# Patient Record
Sex: FEMALE | Race: WHITE | NOT HISPANIC OR LATINO | Employment: FULL TIME | URBAN - METROPOLITAN AREA
[De-identification: names, ages, dates, MRNs, and addresses within clinical notes are randomized per-mention and may not be internally consistent; named-entity substitution may affect disease eponyms.]

---

## 2024-07-23 ENCOUNTER — APPOINTMENT (OUTPATIENT)
Dept: CT IMAGING | Facility: HOSPITAL | Age: 54
End: 2024-07-23
Payer: COMMERCIAL

## 2024-07-23 ENCOUNTER — HOSPITAL ENCOUNTER (EMERGENCY)
Facility: HOSPITAL | Age: 54
Discharge: HOME | End: 2024-07-23
Payer: COMMERCIAL

## 2024-07-23 ENCOUNTER — APPOINTMENT (OUTPATIENT)
Dept: GENERAL RADIOLOGY | Facility: HOSPITAL | Age: 54
End: 2024-07-23
Payer: COMMERCIAL

## 2024-07-23 VITALS
BODY MASS INDEX: 44.75 KG/M2 | WEIGHT: 237 LBS | SYSTOLIC BLOOD PRESSURE: 160 MMHG | DIASTOLIC BLOOD PRESSURE: 81 MMHG | HEIGHT: 61 IN | OXYGEN SATURATION: 97 % | RESPIRATION RATE: 18 BRPM | TEMPERATURE: 98.4 F | HEART RATE: 71 BPM

## 2024-07-23 DIAGNOSIS — I10 HYPERTENSION, UNSPECIFIED TYPE: ICD-10-CM

## 2024-07-23 DIAGNOSIS — R51.9 NONINTRACTABLE HEADACHE, UNSPECIFIED CHRONICITY PATTERN, UNSPECIFIED HEADACHE TYPE: Primary | ICD-10-CM

## 2024-07-23 LAB
ALBUMIN SERPL-MCNC: 4.5 G/DL (ref 3.5–5.2)
ALBUMIN/GLOB SERPL: 1.5 G/DL
ALP SERPL-CCNC: 66 U/L (ref 39–117)
ALT SERPL W P-5'-P-CCNC: 11 U/L (ref 1–33)
ANION GAP SERPL CALCULATED.3IONS-SCNC: 14.1 MMOL/L (ref 5–15)
AST SERPL-CCNC: 17 U/L (ref 1–32)
BACTERIA UR QL AUTO: ABNORMAL /HPF
BASOPHILS # BLD AUTO: 0.02 10*3/MM3 (ref 0–0.2)
BASOPHILS NFR BLD AUTO: 0.4 % (ref 0–1.5)
BILIRUB SERPL-MCNC: 0.8 MG/DL (ref 0–1.2)
BILIRUB UR QL STRIP: NEGATIVE
BUN SERPL-MCNC: 9 MG/DL (ref 6–20)
BUN/CREAT SERPL: 10.6 (ref 7–25)
CALCIUM SPEC-SCNC: 9.7 MG/DL (ref 8.6–10.5)
CHLORIDE SERPL-SCNC: 102 MMOL/L (ref 98–107)
CLARITY UR: ABNORMAL
CO2 SERPL-SCNC: 20.9 MMOL/L (ref 22–29)
COLOR UR: YELLOW
CREAT SERPL-MCNC: 0.85 MG/DL (ref 0.57–1)
DEPRECATED RDW RBC AUTO: 43.4 FL (ref 37–54)
EGFRCR SERPLBLD CKD-EPI 2021: 81 ML/MIN/1.73
EOSINOPHIL # BLD AUTO: 0.02 10*3/MM3 (ref 0–0.4)
EOSINOPHIL NFR BLD AUTO: 0.4 % (ref 0.3–6.2)
ERYTHROCYTE [DISTWIDTH] IN BLOOD BY AUTOMATED COUNT: 13.3 % (ref 12.3–15.4)
GLOBULIN UR ELPH-MCNC: 3 GM/DL
GLUCOSE SERPL-MCNC: 86 MG/DL (ref 65–99)
GLUCOSE UR STRIP-MCNC: NEGATIVE MG/DL
HCT VFR BLD AUTO: 45.1 % (ref 34–46.6)
HGB BLD-MCNC: 14.6 G/DL (ref 12–15.9)
HGB UR QL STRIP.AUTO: NEGATIVE
HOLD SPECIMEN: NORMAL
HOLD SPECIMEN: NORMAL
HYALINE CASTS UR QL AUTO: ABNORMAL /LPF
IMM GRANULOCYTES # BLD AUTO: 0.02 10*3/MM3 (ref 0–0.05)
IMM GRANULOCYTES NFR BLD AUTO: 0.4 % (ref 0–0.5)
KETONES UR QL STRIP: ABNORMAL
LEUKOCYTE ESTERASE UR QL STRIP.AUTO: ABNORMAL
LYMPHOCYTES # BLD AUTO: 1.27 10*3/MM3 (ref 0.7–3.1)
LYMPHOCYTES NFR BLD AUTO: 22.8 % (ref 19.6–45.3)
MCH RBC QN AUTO: 28.9 PG (ref 26.6–33)
MCHC RBC AUTO-ENTMCNC: 32.4 G/DL (ref 31.5–35.7)
MCV RBC AUTO: 89.3 FL (ref 79–97)
MONOCYTES # BLD AUTO: 0.33 10*3/MM3 (ref 0.1–0.9)
MONOCYTES NFR BLD AUTO: 5.9 % (ref 5–12)
NEUTROPHILS NFR BLD AUTO: 3.91 10*3/MM3 (ref 1.7–7)
NEUTROPHILS NFR BLD AUTO: 70.1 % (ref 42.7–76)
NITRITE UR QL STRIP: NEGATIVE
NRBC BLD AUTO-RTO: 0 /100 WBC (ref 0–0.2)
PH UR STRIP.AUTO: <=5 [PH] (ref 5–8)
PLATELET # BLD AUTO: 223 10*3/MM3 (ref 140–450)
PMV BLD AUTO: 10.2 FL (ref 6–12)
POTASSIUM SERPL-SCNC: 3.8 MMOL/L (ref 3.5–5.2)
PROT SERPL-MCNC: 7.5 G/DL (ref 6–8.5)
PROT UR QL STRIP: NEGATIVE
RBC # BLD AUTO: 5.05 10*6/MM3 (ref 3.77–5.28)
RBC # UR STRIP: ABNORMAL /HPF
REF LAB TEST METHOD: ABNORMAL
SODIUM SERPL-SCNC: 137 MMOL/L (ref 136–145)
SP GR UR STRIP: 1.02 (ref 1–1.03)
SQUAMOUS #/AREA URNS HPF: ABNORMAL /HPF
UROBILINOGEN UR QL STRIP: ABNORMAL
WBC # UR STRIP: ABNORMAL /HPF
WBC NRBC COR # BLD AUTO: 5.57 10*3/MM3 (ref 3.4–10.8)
WHOLE BLOOD HOLD COAG: NORMAL

## 2024-07-23 PROCEDURE — 99284 EMERGENCY DEPT VISIT MOD MDM: CPT

## 2024-07-23 PROCEDURE — 80053 COMPREHEN METABOLIC PANEL: CPT | Performed by: NURSE PRACTITIONER

## 2024-07-23 PROCEDURE — 25010000002 HYDRALAZINE PER 20 MG: Performed by: NURSE PRACTITIONER

## 2024-07-23 PROCEDURE — 71045 X-RAY EXAM CHEST 1 VIEW: CPT

## 2024-07-23 PROCEDURE — 93005 ELECTROCARDIOGRAM TRACING: CPT

## 2024-07-23 PROCEDURE — 96374 THER/PROPH/DIAG INJ IV PUSH: CPT

## 2024-07-23 PROCEDURE — 81001 URINALYSIS AUTO W/SCOPE: CPT | Performed by: NURSE PRACTITIONER

## 2024-07-23 PROCEDURE — 70450 CT HEAD/BRAIN W/O DYE: CPT

## 2024-07-23 PROCEDURE — 85025 COMPLETE CBC W/AUTO DIFF WBC: CPT | Performed by: NURSE PRACTITIONER

## 2024-07-23 RX ORDER — HYDRALAZINE HYDROCHLORIDE 20 MG/ML
10 INJECTION INTRAMUSCULAR; INTRAVENOUS ONCE
Status: COMPLETED | OUTPATIENT
Start: 2024-07-23 | End: 2024-07-23

## 2024-07-23 RX ORDER — SODIUM CHLORIDE 0.9 % (FLUSH) 0.9 %
10 SYRINGE (ML) INJECTION AS NEEDED
Status: DISCONTINUED | OUTPATIENT
Start: 2024-07-23 | End: 2024-07-23 | Stop reason: HOSPADM

## 2024-07-23 RX ORDER — AMLODIPINE BESYLATE 10 MG/1
10 TABLET ORAL DAILY
Qty: 30 TABLET | Refills: 0 | Status: SHIPPED | OUTPATIENT
Start: 2024-07-23

## 2024-07-23 RX ADMIN — HYDRALAZINE HYDROCHLORIDE 10 MG: 20 INJECTION INTRAMUSCULAR; INTRAVENOUS at 14:10

## 2024-07-23 NOTE — DISCHARGE INSTRUCTIONS
Medications as directed follow-up pharmacy precautions and warnings regarding any prescriptions.  Follow-up with family medicine.  Return immediately for any new or worsening symptoms

## 2024-07-23 NOTE — ED PROVIDER NOTES
Subjective     Provider in Triage Note  Due to significant overcrowding in the emergency department patient was initially seen and evaluated in triage.  Provider in triage recommended patient placement in the treatment area to initiate therapy and movement to an ER bed as soon as possible.    Patient presents with lightheadedness and dizziness last few days.  No headache.  No chest pain/soa      History of Present Illness  Patient endorses above history.  She states she has had a very mild non-intractable headache that feels like a band.  She denies any visual changes unilateral focal deficits weakness confusion ataxia or lethargy.  She states she was told 7 or 8 months ago that she had high blood pressure but has not been taking blood pressure medications.  No epistaxis.      Review of Systems   Constitutional:  Negative for fever.       No past medical history on file.    No Known Allergies    Past Surgical History:   Procedure Laterality Date    BREAST SURGERY      DENTAL PROCEDURE         No family history on file.    Social History     Socioeconomic History    Marital status: Single   Tobacco Use    Smoking status: Never   Vaping Use    Vaping status: Never Used   Substance and Sexual Activity    Alcohol use: Yes     Comment: occasional    Drug use: Never    Sexual activity: Defer           Objective   Physical Exam    Vital signs in triage nurse note reviewed.  Constitutional: Awake, alert, well developed and well nourished. No acute distress is noted.  HEENT: Normocephalic, atraumatic; pupils are PERRL with intact EOM; oropharynx is pink and moist without exudate or erythema.  Neck: Supple, full range of motion without pain;   Cardiovascular: Regular rate and rhythm, normal S1-S2.    Pulmonary: Respiratory effort regular, nonlabored; breath sounds clear to auscultation all fields.  Abdomen: Soft, nontender, nondistended with normoactive bowel sounds; no rebound or guarding.  Musculoskeletal: Independent range  of motion of all extremities, no palpable tenderness or edema. Spine is midline without obvious curvature scoliosis. No bony tenderness, soft tissue swelling, deformity is noted. Paraspinal musculature is soft, nontender.  Neuro: Alert oriented x3, speech is clear and appropriate.  Normal shoulder shrug, equal palate rise, no peripheral vision loss, no facial asymmetry,no pronator drift, normal coordination.      Procedures           ED Course  ED Course as of 07/23/24 1601   Tue Jul 23, 2024   1334 Assumed care of patient   [JW]   1448 Labs are hemolyzed. Waiting on ua to be collected [JW]      ED Course User Index  [JW] Leida Turk, NARCISO                                   Labs Reviewed   COMPREHENSIVE METABOLIC PANEL - Abnormal; Notable for the following components:       Result Value    CO2 20.9 (*)     All other components within normal limits    Narrative:     GFR Normal >60  Chronic Kidney Disease <60  Kidney Failure <15     URINALYSIS W/ MICROSCOPIC IF INDICATED (NO CULTURE) - Abnormal; Notable for the following components:    Appearance, UA Hazy (*)     Ketones, UA 40 mg/dL (2+) (*)     Leuk Esterase, UA Large (3+) (*)     All other components within normal limits   URINALYSIS, MICROSCOPIC ONLY - Abnormal; Notable for the following components:    WBC, UA 11-20 (*)     Bacteria, UA 1+ (*)     Squamous Epithelial Cells, UA 7-12 (*)     All other components within normal limits   CBC WITH AUTO DIFFERENTIAL - Normal   CBC AND DIFFERENTIAL    Narrative:     The following orders were created for panel order CBC & Differential.  Procedure                               Abnormality         Status                     ---------                               -----------         ------                     CBC Auto Differential[[id number]]        Normal              Final result                 Please view results for these tests on the individual orders.   EXTRA TUBES    Narrative:     The following orders were  created for panel order Extra Tubes.  Procedure                               Abnormality         Status                     ---------                               -----------         ------                     Gold Top - SST[[id number]]                                   Final result               Light Blue Top[[id number]]                                   Final result                 Please view results for these tests on the individual orders.   GOLD TOP - SST   LIGHT BLUE TOP   EXTRA TUBES    Narrative:     The following orders were created for panel order Extra Tubes.  Procedure                               Abnormality         Status                     ---------                               -----------         ------                     Gold Top - SST[[id number]]                                   Final result                 Please view results for these tests on the individual orders.   GOLD TOP - SST     Medications   sodium chloride 0.9 % flush 10 mL (has no administration in time range)   sodium chloride 0.9 % flush 10 mL (has no administration in time range)   hydrALAZINE (APRESOLINE) injection 10 mg (10 mg Intravenous Given 7/23/24 1410)     CT Head Without Contrast    Result Date: 7/23/2024  1.No acute intracranial abnormality is identified. Electronically Signed: Chuck Ferrari MD  7/23/2024 2:29 PM EDT  Workstation ID: [id number]    XR Chest 1 View    Result Date: 7/23/2024  Impression: 1. No acute cardiopulmonary disease. Electronically Signed: Satinder Wolf MD  7/23/2024 2:29 PM EDT  Workstation ID: [id number]   Prior to Admission medications    Medication Sig Start Date End Date Taking? Authorizing Provider   amLODIPine (NORVASC) 10 MG tablet Take 1 tablet by mouth Daily. 7/23/24   Leida Turk APRN   hydroCHLOROthiazide (HYDRODIURIL) 25 MG tablet Take 1 tablet by mouth Daily for 30 days. 12/20/23 7/23/24  Felix Lugo MD               Medical Decision Making      /84   Pulse 80    "Temp 98.4 °F (36.9 °C)   Resp 18   Ht 154.9 cm (61\")   Wt 108 kg (237 lb)   SpO2 96%   BMI 44.78 kg/m²           Radiology interpretation: CT head and chest x-rays reviewed and interpreted by dov:  Further interpretation by radiologist as above  Lab interpretation:  Labs all viewed by me and significant for, glucose 86, contaminated UA, white count 5.5    EKG viewed and interpreted by Dr. monae  , sr 90 baseline artifact                Appropriate PPE worn during exam.  Patient was seen by provider in triage due to overcrowding and census.  Had labs CT x-ray obtained evaluate for pulmonary edema electrolyte abnormalities dehydration infection ICH.  Was given hydralazine with good improvement of her blood pressure.  She states her headache has subsided and she is neurologically intact.  We discussed signs and symptoms of when to return emergently to the ER. Will be discharged with [facility] and given family medicine referral.       i discussed findings with patient who voices understanding of discharge instructions, signs and symptoms requiring return to ED; discharged improved and in stable condition with follow up for re-evaluation.  This document is intended for medical expert use only. Reading of this document by patients and/or patient's family without participating medical staff guidance may result in misinterpretation and unintended morbidity.  Any interpretation of such data is the responsibility of the patient and/or family member responsible for the patient in concert with their primary or specialist providers, not to be left for sources of online searches such as [employer], [employer] or similar queries. Relying on these approaches to knowledge may result in misinterpretation, misguided goals of care and even death should patients or family members try recommendations outside of the realm of professional medical care in a supervised inpatient environment.     Discussed with dr monae      Problems " Addressed:  Hypertension, unspecified type: complicated acute illness or injury  Nonintractable headache, unspecified chronicity pattern, unspecified headache type: complicated acute illness or injury    Amount and/or Complexity of Data Reviewed  Labs: ordered.  Radiology: ordered.  ECG/medicine tests: ordered.    Risk  Prescription drug management.        Final diagnoses:   Nonintractable headache, unspecified chronicity pattern, unspecified headache type   Hypertension, unspecified type       ED Disposition  ED Disposition       ED Disposition   Discharge    Condition   Stable    Comment   --               family connection  [phone]             Medication List        New Prescriptions      amLODIPine 10 MG tablet  Commonly known as: NORVASC  Take 1 tablet by mouth Daily.            Stop      hydroCHLOROthiazide 25 MG tablet               Where to Get Your Medications        These medications were sent to [facility]/pharmacy #[id number] - [place], [place] - [address] [phone] [place] [phone]   [address] [place] [address]      Phone: [phone]   amLODIPine 10 MG tablet            Leida Turk, APRN  07/23/24 4591

## 2024-07-23 NOTE — NURSING NOTE
BP assessed multiple times with manual cuff.  PT immediately brought back to a room upon assessment of HTN.

## 2024-07-24 LAB
QT INTERVAL: 354 MS
QTC INTERVAL: 433 MS

## 2024-08-12 PROBLEM — F32.A DEPRESSION: Status: ACTIVE | Noted: 2024-08-12

## 2024-08-12 NOTE — PROGRESS NOTES
Chief Complaint  Chief Complaint   Patient presents with    Establish Care    Transitional Care Management     HTN    Hypertension        Subjective          Shiva Gutierrez is here today to establish care. The following problems were discussed:     Family history:Mother- heart disease, HTN. Father- HTN. Maternal grandmother- HTN. Grandfather- possible CVA. Paternal grandmother- colon cancer. Mother- breast cancer.     Social history: Denies smoking, drinking, or illegal drug use.     HTN-Denies CP, SOA. She reports she has had dizziness. Gets occasional headaches. Denies lightheadedness. She recently went to the ED due to feeling dizzy and didn't feel right. She had been told before she had HTN. She had not been on HTN medication. She was placed on HTN medication ED.     Migraines- She gets migraines induced by allergies/sinus headaches.     Depression- She is not currently on medication. She used to take celexa and this helped. Denies SI or HI. She does not see a counselor.     Obesity- She exercises 2 days a week. She reports she does not eat a well balanced diet.     History of abnormal mammogram- Has previous biopsy which was negative. Mother history of breast cancer.      She is from [place]   Previous PCP was Has not seen one in 10 years  Marital status- not   Children- No  Works as [employer]   Exercise- regularly 2 times weekly  Diet- Eating too much junk food and Not eating enough healthy food         Review of Systems   Constitutional:  Negative for chills and fever.   HENT:  Positive for congestion.    Respiratory:  Negative for shortness of breath.    Cardiovascular:  Negative for chest pain.   Gastrointestinal:  Positive for nausea. Negative for abdominal pain and vomiting.   Genitourinary:  Negative for difficulty urinating.   Musculoskeletal:  Negative for myalgias.   Skin: Negative.    Neurological:  Negative for dizziness, light-headedness and headache.        Objective   Vital Signs:  "  Vitals:    08/13/24 0915   BP: 139/81   Pulse:    Temp:    SpO2:       Estimated body mass index is 43.86 kg/m² as calculated from the following:    Height as of this encounter: 154.9 cm (60.98\").    Weight as of this encounter: 105 kg (232 lb).    Class 3 Severe Obesity (BMI >=40). Obesity-related health conditions include the following: hypertension. Obesity is unchanged. BMI is is above average; BMI management plan is completed. We discussed portion control and increasing exercise.            Patient screened positive for depression based on a PHQ-9 score of 1 on 8/13/2024. Follow-up recommendations include: PCP managing depression and Prescribed antidepressant medication treatment.        Physical Exam  Vitals reviewed.   Constitutional:       Appearance: Normal appearance. She is obese.   HENT:      Head: Normocephalic and atraumatic.      Nose: Nose normal.      Mouth/Throat:      Mouth: Mucous membranes are moist.      Pharynx: Oropharynx is clear.   Eyes:      Extraocular Movements: Extraocular movements intact.      Conjunctiva/sclera: Conjunctivae normal.      Pupils: Pupils are equal, round, and reactive to light.   Cardiovascular:      Rate and Rhythm: Normal rate and regular rhythm.      Pulses: Normal pulses.      Heart sounds: Normal heart sounds.      Comments: S1, S2 audible  Pulmonary:      Effort: Pulmonary effort is normal.      Breath sounds: Normal breath sounds.      Comments: On room air   Abdominal:      General: Abdomen is flat. Bowel sounds are normal.      Palpations: Abdomen is soft.   Musculoskeletal:         General: Normal range of motion.      Cervical back: Normal range of motion.   Skin:     General: Skin is warm and dry.   Neurological:      General: No focal deficit present.      Mental Status: She is alert and oriented to person, place, and time. Mental status is at baseline.   Psychiatric:         Mood and Affect: Mood normal.         Behavior: Behavior normal.         " Thought Content: Thought content normal.         Judgment: Judgment normal.                Physical Exam   Result Review :             Procedures       Assessment and Plan     Diagnoses and all orders for this visit:    1. Encounter to establish care (Primary)  Assessment & Plan:  She is from [place]   Previous PCP was Has not seen one in 10 years  Marital status- not   Children- No  Works as [employer]   Exercise- regularly 2 times weekly  Diet- Eating too much junk food and Not eating enough healthy food    Family history:Mother- heart disease, HTN. Father- HTN. Maternal grandmother- HTN. Grandfather- possible CVA. Paternal grandmother- colon cancer. Mother- breast cancer.     Social history: Denies smoking, drinking, or illegal drug use.       2. Primary hypertension  Assessment & Plan:  BP: 152/98    HTN-Denies CP, SOA. She reports she has had dizziness. Gets occasional headaches. Denies lightheadedness. She recently went to the ED due to feeling dizzy and didn't feel right. She had been told before she had HTN. She had not been on HTN medication. She was placed on HTN medication ED.     On amlodipine     Check BP daily 1-2 hours after taking medication- let me know if >140/90 consistently   Encourage low sodium diet         3. Other migraine with status migrainosus, intractable  Assessment & Plan:  Migraines- She gets migraines induced by allergies/sinus headaches. She takes excedrin migraine when she needs.               4. Current mild episode of major depressive disorder, unspecified whether recurrent  Assessment & Plan:  Depression- She is not currently on medication. She used to take celexa and this helped. Denies SI or HI. She does not see a counselor.     Prescribed celexa       5. Class 3 severe obesity due to excess calories with serious comorbidity and body mass index (BMI) of 40.0 to 44.9 in adult  Assessment & Plan:  Patient's (Body mass index is 43.86 kg/m².)       Obesity- She  exercises 2 days a week. She reports she does not eat a well balanced diet.     Encourage healthy diet and exercise       6. Family history of breast cancer  Assessment & Plan:  Mammogram ordered     Orders:  -     Mammo Screening Digital Tomosynthesis Bilateral With CAD; Future    7. Breast cancer screening by mammogram  -     Mammo Screening Digital Tomosynthesis Bilateral With CAD; Future    8. Family history of colon cancer  -     Ambulatory Referral For Screening Colonoscopy    9. Colon cancer screening  -     Ambulatory Referral For Screening Colonoscopy    Other orders  -     citalopram (CeleXA) 10 MG tablet; Take 1 tablet by mouth Daily.  Dispense: 90 tablet; Refill: 0              Follow Up   Return in about 3 months (around 11/13/2024) for Annual physical.   Patient was given instructions and counseling regarding her condition or for health maintenance advice. Please see specific information pulled into the AVS if appropriate.

## 2024-08-13 ENCOUNTER — OFFICE VISIT (OUTPATIENT)
Dept: FAMILY MEDICINE CLINIC | Facility: CLINIC | Age: 54
End: 2024-08-13
Payer: COMMERCIAL

## 2024-08-13 VITALS
HEART RATE: 79 BPM | BODY MASS INDEX: 43.8 KG/M2 | SYSTOLIC BLOOD PRESSURE: 139 MMHG | OXYGEN SATURATION: 97 % | TEMPERATURE: 98.6 F | WEIGHT: 232 LBS | HEIGHT: 61 IN | DIASTOLIC BLOOD PRESSURE: 81 MMHG

## 2024-08-13 DIAGNOSIS — E66.01 CLASS 3 SEVERE OBESITY DUE TO EXCESS CALORIES WITH SERIOUS COMORBIDITY AND BODY MASS INDEX (BMI) OF 40.0 TO 44.9 IN ADULT: ICD-10-CM

## 2024-08-13 DIAGNOSIS — Z76.89 ENCOUNTER TO ESTABLISH CARE: Primary | ICD-10-CM

## 2024-08-13 DIAGNOSIS — Z80.0 FAMILY HISTORY OF COLON CANCER: ICD-10-CM

## 2024-08-13 DIAGNOSIS — I10 PRIMARY HYPERTENSION: ICD-10-CM

## 2024-08-13 DIAGNOSIS — G43.811 OTHER MIGRAINE WITH STATUS MIGRAINOSUS, INTRACTABLE: ICD-10-CM

## 2024-08-13 DIAGNOSIS — Z80.3 FAMILY HISTORY OF BREAST CANCER: ICD-10-CM

## 2024-08-13 DIAGNOSIS — Z12.11 COLON CANCER SCREENING: ICD-10-CM

## 2024-08-13 DIAGNOSIS — F32.0 CURRENT MILD EPISODE OF MAJOR DEPRESSIVE DISORDER, UNSPECIFIED WHETHER RECURRENT: ICD-10-CM

## 2024-08-13 DIAGNOSIS — Z12.31 BREAST CANCER SCREENING BY MAMMOGRAM: ICD-10-CM

## 2024-08-13 PROBLEM — G43.909 MIGRAINES: Status: ACTIVE | Noted: 2024-08-13

## 2024-08-13 PROBLEM — E66.813 CLASS 3 SEVERE OBESITY DUE TO EXCESS CALORIES WITH SERIOUS COMORBIDITY AND BODY MASS INDEX (BMI) OF 40.0 TO 44.9 IN ADULT: Status: ACTIVE | Noted: 2024-08-13

## 2024-08-13 PROCEDURE — 99214 OFFICE O/P EST MOD 30 MIN: CPT | Performed by: NURSE PRACTITIONER

## 2024-08-13 RX ORDER — CETIRIZINE HYDROCHLORIDE 10 MG/1
10 TABLET ORAL AS NEEDED
COMMUNITY

## 2024-08-13 RX ORDER — CITALOPRAM HYDROBROMIDE 10 MG/1
10 TABLET ORAL DAILY
Qty: 90 TABLET | Refills: 0 | Status: SHIPPED | OUTPATIENT
Start: 2024-08-13

## 2024-08-13 NOTE — ASSESSMENT & PLAN NOTE
BP: 152/98    HTN-Denies CP, SOA. She reports she has had dizziness. Gets occasional headaches. Denies lightheadedness. She recently went to the ED due to feeling dizzy and didn't feel right. She had been told before she had HTN. She had not been on HTN medication. She was placed on HTN medication ED.     On amlodipine     Check BP daily 1-2 hours after taking medication- let me know if >140/90 consistently   Encourage low sodium diet

## 2024-08-13 NOTE — ASSESSMENT & PLAN NOTE
Migraines- She gets migraines induced by allergies/sinus headaches. She takes excedrin migraine when she needs.

## 2024-08-13 NOTE — ASSESSMENT & PLAN NOTE
She is from [place]   Previous PCP was Has not seen one in 10 years  Marital status- not   Children- No  Works as [employer]   Exercise- regularly 2 times weekly  Diet- Eating too much junk food and Not eating enough healthy food    Family history:Mother- heart disease, HTN. Father- HTN. Maternal grandmother- HTN. Grandfather- possible CVA. Paternal grandmother- colon cancer. Mother- breast cancer.     Social history: Denies smoking, drinking, or illegal drug use.

## 2024-08-13 NOTE — PATIENT INSTRUCTIONS
Prescribed Celexa  Encourage counseling   Encourage low sodium diet   Check BP daily 1-2 hours after taking medication- let me know if >140/90 consistently   Continue amlodipine  Mammogram ordered- F will call you to schedule  Colonoscopy- Call them to schedule

## 2024-08-13 NOTE — ASSESSMENT & PLAN NOTE
Depression- She is not currently on medication. She used to take celexa and this helped. Denies SI or HI. She does not see a counselor.     Prescribed celexa

## 2024-08-13 NOTE — ASSESSMENT & PLAN NOTE
Patient's (Body mass index is 43.86 kg/m².)       Obesity- She exercises 2 days a week. She reports she does not eat a well balanced diet.     Encourage healthy diet and exercise

## 2024-08-27 ENCOUNTER — TELEPHONE (OUTPATIENT)
Dept: FAMILY MEDICINE CLINIC | Facility: CLINIC | Age: 54
End: 2024-08-27
Payer: COMMERCIAL

## 2024-08-27 RX ORDER — AMLODIPINE BESYLATE 10 MG/1
10 TABLET ORAL DAILY
Qty: 90 TABLET | Refills: 2 | Status: SHIPPED | OUTPATIENT
Start: 2024-08-27

## 2024-08-27 NOTE — TELEPHONE ENCOUNTER
----- Message from [facility] sent at 8/27/2024 12:59 PM EDT -----  Regarding: My blood pressure medication  Contact: [phone]  Sole Arenas you please send my blood pressure medication refill in to the [facility] on [facility]? I got my Celexa, but I need the amlodipine.    Thank you,

## 2024-11-11 PROBLEM — Z76.89 ENCOUNTER TO ESTABLISH CARE: Status: RESOLVED | Noted: 2024-08-13 | Resolved: 2024-11-11

## 2024-11-11 NOTE — PROGRESS NOTES
Chief Complaint  Chief Complaint   Patient presents with    Annual Exam     Pt stated she may have shingles on left side of her hip, noticed Sunday     Depression        Subjective          Shiva Gutierrez is a 54-year-old female who reports to the office today for annual physical.    Annual physical-Denies any new family history. Family history:Mother- heart disease, HTN. Father- HTN. Maternal grandmother- HTN. Grandfather- possible CVA. Paternal grandmother- colon cancer. Mother- breast cancer. Social history: Denies smoking, drinks 2-3 times a month, or illegal drug use.      HTN-Denies CP, SOA, dizziness, lightheadedness. She has a headache- but has been on a month long peroid. Compliant on medication. She has checked her BP at home on and off and usually around 170's systolically.      Migraines- She gets migraines induced by allergies/sinus headaches. She has not had one for a while.      Depression- She reports she feels better on the celexa.Denies SI or HI. She does not see a counselor.      Obesity- She walks 3 days a week. She reports she does not eat a well balanced diet.      History of abnormal mammogram- Has previous biopsy which was negative. Mother history of breast cancer.    Left hip rash- She reports it is irritating and the rash started on Sunday. She had chicken pox as a child. She denies pain.         Labs-Due  Colonoscopy-Due  Mammogram-Due   Papsmear-Due       Vaccines:  Flu-She is going to receive at work  Shingles-Due  Covid-19-She received some doses, not receiving anymore    Dental exam-Due  Eye exam-UTD          Review of Systems   Constitutional:  Negative for chills and fever.   HENT:  Negative for congestion.    Eyes: Negative.    Respiratory:  Negative for shortness of breath.    Cardiovascular:  Negative for chest pain.   Gastrointestinal:  Positive for nausea. Negative for abdominal pain and vomiting.   Genitourinary:  Negative for difficulty urinating.   Musculoskeletal:   "Negative for myalgias.   Skin:  Positive for rash.   Neurological:  Negative for dizziness, light-headedness and headache.   Psychiatric/Behavioral:  Negative for self-injury, suicidal ideas and depressed mood. The patient is not nervous/anxious.         Objective   Vital Signs:   Vitals:    11/13/24 1006   BP: 141/91   Pulse:    Temp:    SpO2:       Estimated body mass index is 44.99 kg/m² as calculated from the following:    Height as of this encounter: 154.9 cm (60.98\").    Weight as of this encounter: 108 kg (238 lb).                  Patient screened negative for depression based on a PHQ-9 score of 0 on 11/13/2024 . Follow-up recommendations include: PCP managing depression.        Physical Exam  Vitals reviewed.   Constitutional:       Appearance: Normal appearance. She is obese.   HENT:      Head: Normocephalic and atraumatic.      Right Ear: Tympanic membrane, ear canal and external ear normal.      Left Ear: Tympanic membrane, ear canal and external ear normal.      Ears:      Comments: Slight wax noted bilaterally      Nose: Nose normal.      Mouth/Throat:      Mouth: Mucous membranes are moist.      Pharynx: Oropharynx is clear.   Eyes:      Extraocular Movements: Extraocular movements intact.      Conjunctiva/sclera: Conjunctivae normal.      Pupils: Pupils are equal, round, and reactive to light.   Cardiovascular:      Rate and Rhythm: Normal rate and regular rhythm.      Pulses: Normal pulses.      Heart sounds: Normal heart sounds.      Comments: S1, S2 audible  Pulmonary:      Effort: Pulmonary effort is normal.      Breath sounds: Normal breath sounds.      Comments: On room air   Abdominal:      General: Abdomen is flat. Bowel sounds are normal.      Palpations: Abdomen is soft.   Musculoskeletal:         General: Normal range of motion.      Cervical back: Normal range of motion and neck supple.   Skin:     General: Skin is warm and dry.      Comments: Disseminated rash noted on left hip- " shingles   Neurological:      General: No focal deficit present.      Mental Status: She is alert and oriented to person, place, and time. Mental status is at baseline.   Psychiatric:         Mood and Affect: Mood normal.         Behavior: Behavior normal.         Thought Content: Thought content normal.         Judgment: Judgment normal.                Physical Exam   Result Review :             Procedures       Assessment and Plan     Diagnoses and all orders for this visit:    1. Annual physical exam (Primary)  Assessment & Plan:  Labs-Due  Colonoscopy-Due  Mammogram-Due   Papsmear-Due       Vaccines:  Flu-She is going to receive at work  Shingles-Due  Covid-19-She received some doses, not receiving anymore    Dental exam-Due  Eye exam-UTD     Encouraged healthy diet and exercise  Encouraged monthly self breast exams  Check labs  Encouraged to call about colonoscopy   Encourage mammogram   Encourage dental exam    Orders:  -     Comprehensive Metabolic Panel  -     Hemoglobin A1c  -     Hepatitis C Antibody  -     Lipid Panel    2. Primary hypertension  Assessment & Plan:  BP: 178/93, 141/91    On amlodipine     HTN-Denies CP, SOA, dizziness, lightheadedness. She has a headache- but has been on a month long peroid. Compliant on medication. She has checked her BP at home on and off and usually around 170's systolically.     Prescribed lisinopril   Keep BP log- Check BP 1-2 hours after taking medication   Bring BP monitor to next appointment   Encourage low sodium diet   Check BP if symptomatic- Chest pain, shortness of breath, dizziness, lightheadedness, heart palpitations, or headache        3. Current mild episode of major depressive disorder, unspecified whether recurrent  Assessment & Plan:  On Celexa     Depression- She reports she feels better on the celexa.Denies SI or HI. She does not see a counselor.          4. Women's annual routine gynecological examination  -     Ambulatory Referral to Obstetrics /  Gynecology    5. Other migraine with status migrainosus, intractable  Assessment & Plan:      Migraines- She gets migraines induced by allergies/sinus headaches. She has not had one for a while.           6. Class 3 severe obesity due to excess calories with serious comorbidity and body mass index (BMI) of 40.0 to 44.9 in adult  Assessment & Plan:  Patient's (Body mass index is 44.99 kg/m².)     Obesity- She walks 3 days a week. She reports she does not eat a well balanced diet.     Encourage healthy diet and exercise       7. Disseminated herpes zoster  Assessment & Plan:  Left hip rash- She reports it is irritating and the rash started on Sunday. She had chicken pox as a child. She denies pain.     Rash with disseminated spots noted on left hip     Prescribed Valcyclovir   Shingles education printed      Other orders  -     citalopram (CeleXA) 10 MG tablet; Take 1 tablet by mouth Daily.  Dispense: 90 tablet; Refill: 3  -     lisinopril (PRINIVIL,ZESTRIL) 10 MG tablet; Take 1 tablet by mouth Daily.  Dispense: 30 tablet; Refill: 0  -     valACYclovir (Valtrex) 1000 MG tablet; Take 1 tablet by mouth 2 (Two) Times a Day.  Dispense: 20 tablet; Refill: 0              Follow Up   Return in about 2 weeks (around 11/27/2024) for HTN, Recheck.   Patient was given instructions and counseling regarding her condition or for health maintenance advice. Please see specific information pulled into the AVS if appropriate.

## 2024-11-13 ENCOUNTER — OFFICE VISIT (OUTPATIENT)
Dept: FAMILY MEDICINE CLINIC | Facility: CLINIC | Age: 54
End: 2024-11-13
Payer: COMMERCIAL

## 2024-11-13 VITALS
BODY MASS INDEX: 44.93 KG/M2 | OXYGEN SATURATION: 100 % | SYSTOLIC BLOOD PRESSURE: 141 MMHG | HEART RATE: 74 BPM | HEIGHT: 61 IN | WEIGHT: 238 LBS | DIASTOLIC BLOOD PRESSURE: 91 MMHG | TEMPERATURE: 98.7 F

## 2024-11-13 DIAGNOSIS — G43.811 OTHER MIGRAINE WITH STATUS MIGRAINOSUS, INTRACTABLE: ICD-10-CM

## 2024-11-13 DIAGNOSIS — Z00.00 ANNUAL PHYSICAL EXAM: Primary | ICD-10-CM

## 2024-11-13 DIAGNOSIS — E66.01 CLASS 3 SEVERE OBESITY DUE TO EXCESS CALORIES WITH SERIOUS COMORBIDITY AND BODY MASS INDEX (BMI) OF 40.0 TO 44.9 IN ADULT: ICD-10-CM

## 2024-11-13 DIAGNOSIS — F32.0 CURRENT MILD EPISODE OF MAJOR DEPRESSIVE DISORDER, UNSPECIFIED WHETHER RECURRENT: ICD-10-CM

## 2024-11-13 DIAGNOSIS — B02.7 DISSEMINATED HERPES ZOSTER: ICD-10-CM

## 2024-11-13 DIAGNOSIS — I10 PRIMARY HYPERTENSION: ICD-10-CM

## 2024-11-13 DIAGNOSIS — Z01.419 WOMEN'S ANNUAL ROUTINE GYNECOLOGICAL EXAMINATION: ICD-10-CM

## 2024-11-13 DIAGNOSIS — E66.813 CLASS 3 SEVERE OBESITY DUE TO EXCESS CALORIES WITH SERIOUS COMORBIDITY AND BODY MASS INDEX (BMI) OF 40.0 TO 44.9 IN ADULT: ICD-10-CM

## 2024-11-13 PROBLEM — B02.9 SHINGLES: Status: ACTIVE | Noted: 2024-11-13

## 2024-11-13 PROCEDURE — 80053 COMPREHEN METABOLIC PANEL: CPT | Performed by: NURSE PRACTITIONER

## 2024-11-13 PROCEDURE — 83036 HEMOGLOBIN GLYCOSYLATED A1C: CPT | Performed by: NURSE PRACTITIONER

## 2024-11-13 PROCEDURE — 86803 HEPATITIS C AB TEST: CPT | Performed by: NURSE PRACTITIONER

## 2024-11-13 PROCEDURE — 99214 OFFICE O/P EST MOD 30 MIN: CPT | Performed by: NURSE PRACTITIONER

## 2024-11-13 PROCEDURE — 80061 LIPID PANEL: CPT | Performed by: NURSE PRACTITIONER

## 2024-11-13 PROCEDURE — 99396 PREV VISIT EST AGE 40-64: CPT | Performed by: NURSE PRACTITIONER

## 2024-11-13 RX ORDER — LISINOPRIL 10 MG/1
10 TABLET ORAL DAILY
Qty: 30 TABLET | Refills: 0 | Status: SHIPPED | OUTPATIENT
Start: 2024-11-13

## 2024-11-13 RX ORDER — CITALOPRAM HYDROBROMIDE 10 MG/1
10 TABLET ORAL DAILY
Qty: 90 TABLET | Refills: 3 | Status: SHIPPED | OUTPATIENT
Start: 2024-11-13

## 2024-11-13 RX ORDER — VALACYCLOVIR HYDROCHLORIDE 1 G/1
1000 TABLET, FILM COATED ORAL 2 TIMES DAILY
Qty: 20 TABLET | Refills: 0 | Status: SHIPPED | OUTPATIENT
Start: 2024-11-13

## 2024-11-13 NOTE — ASSESSMENT & PLAN NOTE
Patient's (Body mass index is 44.99 kg/m².)     Obesity- She walks 3 days a week. She reports she does not eat a well balanced diet.     Encourage healthy diet and exercise

## 2024-11-13 NOTE — ASSESSMENT & PLAN NOTE
On Celexa     Depression- She reports she feels better on the celexa.Denies SI or HI. She does not see a counselor.

## 2024-11-13 NOTE — ASSESSMENT & PLAN NOTE
Migraines- She gets migraines induced by allergies/sinus headaches. She has not had one for a while.

## 2024-11-13 NOTE — ASSESSMENT & PLAN NOTE
Left hip rash- She reports it is irritating and the rash started on Sunday. She had chicken pox as a child. She denies pain.     Rash with disseminated spots noted on left hip     Prescribed Valcyclovir   Shingles education printed

## 2024-11-13 NOTE — ASSESSMENT & PLAN NOTE
BP: 178/93, 141/91    On amlodipine     HTN-Denies CP, SOA, dizziness, lightheadedness. She has a headache- but has been on a month long peroid. Compliant on medication. She has checked her BP at home on and off and usually around 170's systolically.     Prescribed lisinopril   Keep BP log- Check BP 1-2 hours after taking medication   Bring BP monitor to next appointment   Encourage low sodium diet   Check BP if symptomatic- Chest pain, shortness of breath, dizziness, lightheadedness, heart palpitations, or headache

## 2024-11-13 NOTE — PROGRESS NOTES
Venipuncture performed in left arm by Maria De Jesus Ospina CMA  with good hemostasis. Patient tolerated well. 11/13/24 Maria De Jesus Ospina CMA

## 2024-11-13 NOTE — PATIENT INSTRUCTIONS
Encouraged healthy diet and exercise  Encouraged monthly self breast exams  Check labs  Encouraged to call about colonoscopy   Encourage mammogram   Encourage dental exam  Prescribed lisinopril , continue amlodipine  Keep BP log- Check BP 1-2 hours after taking medication   Bring BP monitor to next appointment   Encourage low sodium diet   Check BP if symptomatic- Chest pain, shortness of breath, dizziness, lightheadedness, heart palpitations, or headache    Prescribed valcyclovir  Shingles education printed

## 2024-11-13 NOTE — ASSESSMENT & PLAN NOTE
Labs-Due  Colonoscopy-Due  Mammogram-Due   Papsmear-Due       Vaccines:  Flu-She is going to receive at work  Shingles-Due  Covid-19-She received some doses, not receiving anymore    Dental exam-Due  Eye exam-UTD     Encouraged healthy diet and exercise  Encouraged monthly self breast exams  Check labs  Encouraged to call about colonoscopy   Encourage mammogram   Encourage dental exam

## 2024-11-14 LAB
ALBUMIN SERPL-MCNC: 3.7 G/DL (ref 3.5–5.2)
ALBUMIN/GLOB SERPL: 1.1 G/DL
ALP SERPL-CCNC: 59 U/L (ref 39–117)
ALT SERPL W P-5'-P-CCNC: 17 U/L (ref 1–33)
ANION GAP SERPL CALCULATED.3IONS-SCNC: 9 MMOL/L (ref 5–15)
AST SERPL-CCNC: 20 U/L (ref 1–32)
BILIRUB SERPL-MCNC: 0.4 MG/DL (ref 0–1.2)
BUN SERPL-MCNC: 15 MG/DL (ref 6–20)
BUN/CREAT SERPL: 20.3 (ref 7–25)
CALCIUM SPEC-SCNC: 9.2 MG/DL (ref 8.6–10.5)
CHLORIDE SERPL-SCNC: 105 MMOL/L (ref 98–107)
CHOLEST SERPL-MCNC: 257 MG/DL (ref 0–200)
CO2 SERPL-SCNC: 24 MMOL/L (ref 22–29)
CREAT SERPL-MCNC: 0.74 MG/DL (ref 0.57–1)
EGFRCR SERPLBLD CKD-EPI 2021: 95.7 ML/MIN/1.73
GLOBULIN UR ELPH-MCNC: 3.5 GM/DL
GLUCOSE SERPL-MCNC: 106 MG/DL (ref 65–99)
HBA1C MFR BLD: 5.4 % (ref 4.8–5.6)
HCV AB SER QL: NORMAL
HDLC SERPL-MCNC: 55 MG/DL (ref 40–60)
LDLC SERPL CALC-MCNC: 187 MG/DL (ref 0–100)
LDLC/HDLC SERPL: 3.36 {RATIO}
POTASSIUM SERPL-SCNC: 4.6 MMOL/L (ref 3.5–5.2)
PROT SERPL-MCNC: 7.2 G/DL (ref 6–8.5)
SODIUM SERPL-SCNC: 138 MMOL/L (ref 136–145)
TRIGL SERPL-MCNC: 86 MG/DL (ref 0–150)
VLDLC SERPL-MCNC: 15 MG/DL (ref 5–40)

## 2024-11-15 PROBLEM — E78.5 HLD (HYPERLIPIDEMIA): Status: ACTIVE | Noted: 2024-11-15

## 2024-11-26 ENCOUNTER — TELEPHONE (OUTPATIENT)
Dept: FAMILY MEDICINE CLINIC | Facility: CLINIC | Age: 54
End: 2024-11-26
Payer: COMMERCIAL

## 2024-11-26 NOTE — TELEPHONE ENCOUNTER
LVM regarding referral to gastroenterology. Inquiring if has been scheduled or has been seen    Relay